# Patient Record
Sex: FEMALE | Race: WHITE | NOT HISPANIC OR LATINO | Employment: FULL TIME | URBAN - METROPOLITAN AREA
[De-identification: names, ages, dates, MRNs, and addresses within clinical notes are randomized per-mention and may not be internally consistent; named-entity substitution may affect disease eponyms.]

---

## 2017-08-28 ENCOUNTER — LAB REQUISITION (OUTPATIENT)
Dept: LAB | Facility: HOSPITAL | Age: 37
End: 2017-08-28

## 2017-08-28 DIAGNOSIS — K82.8 OTHER SPECIFIED DISEASES OF GALLBLADDER: ICD-10-CM

## 2017-08-28 PROCEDURE — 88304 TISSUE EXAM BY PATHOLOGIST: CPT | Performed by: SURGERY

## 2017-08-29 LAB
CYTO UR: NORMAL
LAB AP CASE REPORT: NORMAL
LAB AP CLINICAL INFORMATION: NORMAL
Lab: NORMAL
PATH REPORT.FINAL DX SPEC: NORMAL
PATH REPORT.GROSS SPEC: NORMAL

## 2023-08-16 ENCOUNTER — OFFICE VISIT (OUTPATIENT)
Dept: NEUROSURGERY | Facility: CLINIC | Age: 43
End: 2023-08-16
Payer: COMMERCIAL

## 2023-08-16 VITALS — WEIGHT: 241.2 LBS | HEIGHT: 63 IN | TEMPERATURE: 97.1 F | BODY MASS INDEX: 42.74 KG/M2

## 2023-08-16 DIAGNOSIS — M50.30 DDD (DEGENERATIVE DISC DISEASE), CERVICAL: ICD-10-CM

## 2023-08-16 DIAGNOSIS — M50.10 HERNIATION OF CERVICAL INTERVERTEBRAL DISC WITH RADICULOPATHY: Primary | ICD-10-CM

## 2023-08-16 PROCEDURE — 99203 OFFICE O/P NEW LOW 30 MIN: CPT | Performed by: NEUROLOGICAL SURGERY

## 2023-08-16 RX ORDER — DILTIAZEM HYDROCHLORIDE 300 MG/1
300 CAPSULE, COATED, EXTENDED RELEASE ORAL DAILY
COMMUNITY
Start: 2023-07-28

## 2023-08-16 RX ORDER — CITALOPRAM 20 MG/1
20 TABLET ORAL DAILY
COMMUNITY

## 2023-08-16 RX ORDER — DIAZEPAM 5 MG/1
TABLET ORAL
COMMUNITY

## 2023-08-16 RX ORDER — ACETAMINOPHEN AND CODEINE PHOSPHATE 300; 30 MG/1; MG/1
TABLET ORAL
COMMUNITY

## 2023-08-16 NOTE — PROGRESS NOTES
Patient: Ana Lorenzana  : 1980    Primary Care Provider: Russ Ibanez DO    Requesting Provider: As above        History    Chief Complaint: Neck and right upper extremity pain with sensory alteration.    History of Present Illness: Ms. 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  This was progressive and she was seen in the emergency room and treated with medicines.  She has done some physical therapy.  Manual traction was somewhat helpful.  Her symptoms are little better but still quite limiting.  Pain involves her neck and right shoulder blade as well as the right collarbone region.  She has sensory alteration extending down to the middle digits of the right hand.  She has no left upper extremity symptoms.  Symptoms are much worse with coughing and sneezing.    Review of Systems   Constitutional:  Positive for activity change. Negative for appetite change, chills, diaphoresis, fatigue, fever and unexpected weight change.   HENT:  Negative for congestion, dental problem, drooling, ear discharge, ear pain, facial swelling, hearing loss, mouth sores, nosebleeds, postnasal drip, rhinorrhea, sinus pressure, sinus pain, sneezing, sore throat, tinnitus, trouble swallowing and voice change.    Eyes:  Positive for itching. Negative for photophobia, pain, discharge, redness and visual disturbance.   Respiratory:  Negative for apnea, cough, choking, chest tightness, shortness of breath, wheezing and stridor.    Cardiovascular:  Positive for leg swelling. Negative for chest pain and palpitations.   Gastrointestinal:  Negative for abdominal distention, abdominal pain, anal bleeding, blood in stool, constipation, diarrhea, nausea, rectal pain and vomiting.   Endocrine: Negative for cold intolerance, heat intolerance, polydipsia, polyphagia and polyuria.   Genitourinary:  Negative for decreased urine volume,  "difficulty urinating, dyspareunia, dysuria, enuresis, flank pain, frequency, genital sores, hematuria, menstrual problem, pelvic pain, urgency, vaginal bleeding, vaginal discharge and vaginal pain.   Musculoskeletal:  Positive for neck pain and neck stiffness. Negative for arthralgias, back pain, gait problem, joint swelling and myalgias.   Skin:  Negative for color change, pallor, rash and wound.   Allergic/Immunologic: Negative for environmental allergies, food allergies and immunocompromised state.   Neurological:  Positive for headaches. Negative for dizziness, tremors, seizures, syncope, facial asymmetry, speech difficulty, weakness, light-headedness and numbness.   Hematological:  Negative for adenopathy. Does not bruise/bleed easily.   Psychiatric/Behavioral:  Negative for agitation, behavioral problems, confusion, decreased concentration, dysphoric mood, hallucinations, self-injury, sleep disturbance and suicidal ideas. The patient is not nervous/anxious and is not hyperactive.      The patient's past medical history, past surgical history, family history, and social history have been reviewed at length in the electronic medical record.      Physical Exam:   Temp 97.1 øF (36.2 øC) (Infrared)   Ht 160 cm (63\")   Wt 109 kg (241 lb 3.2 oz)   BMI 42.73 kg/mý   CONSTITUTIONAL: Patient is well-nourished, pleasant and appears stated age.  MUSCULOSKELETAL:  Neck tenderness to palpation is not observed.   ROM in the neck is somewhat limited in all directions.  Extension is quite uncomfortable and limited.  NEUROLOGICAL:  Orientation, memory, attention span, language function, and cognition have been examined and are intact.  Strength is intact in the upper and lower extremities to direct testing.  Muscle tone is normal throughout.  Station and gait are normal.  Sensation is intact to light touch testing throughout.  Deep tendon reflexes are 1+ and symmetrical.  Santos's Sign is negative bilaterally. No clonus is " elicited.  Coordination is intact.      Medical Decision Making    Data Review:   (All imaging studies were personally reviewed unless stated otherwise)  MRI of the cervical spine dated 6/29/2023 demonstrates some loss of the normal lordosis.  There are some broad-based disc-osteophyte a little bit more prominent to the left at C5-6.  Broad-based burring with soft disc herniation to the right is noted at C6-7.    Diagnosis:   C6-7 disc herniation with right C7 radiculopathy.    Treatment Options:   At length I discussed treatment options with the patient.  She is not amenable to medicine such as gabapentin.  Nor is she enthusiastic about epidural injections.  As such, I have referred her for more physical therapy and I have prescribed a home traction unit.  She will follow-up in several weeks.  If she is not doing better then we will need to consider ACDF at C6-7.       Diagnosis Plan   1. Herniation of cervical intervertebral disc with radiculopathy        2. DDD (degenerative disc disease), cervical            Scribed for Iggy Hines MD by KRISTIAN Chan 8/16/2023 09:13 EDT      I, Dr. Hines, personally performed the services described in the documentation, as scribed in my presence, and it is both accurate and complete.